# Patient Record
Sex: MALE | Race: WHITE | HISPANIC OR LATINO | URBAN - METROPOLITAN AREA
[De-identification: names, ages, dates, MRNs, and addresses within clinical notes are randomized per-mention and may not be internally consistent; named-entity substitution may affect disease eponyms.]

---

## 2022-04-06 ENCOUNTER — OFFICE VISIT (OUTPATIENT)
Dept: URBAN - METROPOLITAN AREA CLINIC 74 | Facility: CLINIC | Age: 62
End: 2022-04-06

## 2022-04-21 ENCOUNTER — OFFICE VISIT (OUTPATIENT)
Dept: URBAN - METROPOLITAN AREA CLINIC 74 | Facility: CLINIC | Age: 62
End: 2022-04-21

## 2022-06-09 ENCOUNTER — WEB ENCOUNTER (OUTPATIENT)
Dept: URBAN - METROPOLITAN AREA CLINIC 74 | Facility: CLINIC | Age: 62
End: 2022-06-09

## 2022-06-09 ENCOUNTER — OFFICE VISIT (OUTPATIENT)
Dept: URBAN - METROPOLITAN AREA CLINIC 74 | Facility: CLINIC | Age: 62
End: 2022-06-09
Payer: COMMERCIAL

## 2022-06-09 ENCOUNTER — LAB OUTSIDE AN ENCOUNTER (OUTPATIENT)
Dept: URBAN - METROPOLITAN AREA CLINIC 74 | Facility: CLINIC | Age: 62
End: 2022-06-09

## 2022-06-09 VITALS
SYSTOLIC BLOOD PRESSURE: 150 MMHG | HEIGHT: 66 IN | OXYGEN SATURATION: 95 % | TEMPERATURE: 97.6 F | WEIGHT: 206.6 LBS | BODY MASS INDEX: 33.2 KG/M2 | DIASTOLIC BLOOD PRESSURE: 80 MMHG | HEART RATE: 65 BPM

## 2022-06-09 DIAGNOSIS — Z86.19 HISTORY OF HEPATITIS C: ICD-10-CM

## 2022-06-09 DIAGNOSIS — Z86.010 PERSONAL HISTORY OF COLONIC POLYPS: ICD-10-CM

## 2022-06-09 DIAGNOSIS — E66.8 OTHER OBESITY: ICD-10-CM

## 2022-06-09 PROCEDURE — 99203 OFFICE O/P NEW LOW 30 MIN: CPT | Performed by: INTERNAL MEDICINE

## 2022-06-09 PROCEDURE — 99243 OFF/OP CNSLTJ NEW/EST LOW 30: CPT | Performed by: INTERNAL MEDICINE

## 2022-06-09 RX ORDER — POLYETHYLENE GLYOCOL 3350, SODIUM CHLORIDE, SODIUM BICARBONATE AND POTASSIUM CHLORIDE 420; 11.2; 5.72; 1.48 G/4L; G/4L; G/4L; G/4L
AS DIRECTED POWDER, FOR SOLUTION NASOGASTRIC; ORAL
Qty: 1 | Refills: 0 | OUTPATIENT
Start: 2022-06-09 | End: 2022-06-10

## 2022-06-09 NOTE — HPI-TODAY'S VISIT:
The patient is a 61-year-old [facility] male.  The patient was referred by Dr. Guru Andujar for consultation.  A copy of these document is being forwarded to the referring physician.  The patient presents to the office stating that he did have a colonoscopy possibly 7 or 8 years ago, at the time he had polyps removed.  Currently the patient denies having any diarrhea, constipation, rectal bleeding or hematochezia, abdominal pain, unexplained weight loss.  There is no family history of colon cancer or colon polyps.  Currently the patient denies having any dysphagia, odynophagia, heartburn or regurgitation, there is no nausea or vomiting.  There is no evidence of upper GI bleeding.  The patient claims that he did have hepatitis C years ago and that it was fully resolved.  The patient was treated by \[facility]\"".  We will obtain past medical records for review.  The patient has been advised to have a surveillance colonoscopy.  Benefits potential complications and alternatives to colonoscopy were disclosed.  The patient has been advised to lose weight.  The patient will return for a follow-up visit after completion of testing.

## 2022-07-19 ENCOUNTER — OFFICE VISIT (OUTPATIENT)
Dept: URBAN - METROPOLITAN AREA SURGERY CENTER 30 | Facility: SURGERY CENTER | Age: 62
End: 2022-07-19
Payer: COMMERCIAL

## 2022-07-19 DIAGNOSIS — Z12.11 COLON CANCER SCREENING: ICD-10-CM

## 2022-07-19 DIAGNOSIS — Z86.010 ADENOMAS PERSONAL HISTORY OF COLONIC POLYPS: ICD-10-CM

## 2022-07-19 PROCEDURE — 45378 DIAGNOSTIC COLONOSCOPY: CPT | Performed by: INTERNAL MEDICINE

## 2022-07-19 PROCEDURE — G8907 PT DOC NO EVENTS ON DISCHARG: HCPCS | Performed by: INTERNAL MEDICINE

## 2022-08-16 PROBLEM — 93871000119101: Status: ACTIVE | Noted: 2022-06-09

## 2022-08-18 ENCOUNTER — OFFICE VISIT (OUTPATIENT)
Dept: URBAN - METROPOLITAN AREA CLINIC 74 | Facility: CLINIC | Age: 62
End: 2022-08-18

## 2022-08-18 RX ORDER — MELOXICAM 7.5 MG
1 TABLET TABLET ORAL ONCE A DAY
Status: ACTIVE | COMMUNITY

## 2022-08-18 NOTE — HPI-TODAY'S VISIT:
The patient is a 61-year-old [facility] male.  The patient was referred by Dr. Guru Andujar for consultation.  A copy of these document is being forwarded to the referring physician.  The patient presents to the office stating that he did have a colonoscopy possibly 7 or 8 years ago, at the time he had polyps removed.  Currently the patient denies having any diarrhea, constipation, rectal bleeding or hematochezia, abdominal pain, unexplained weight loss.  There is no family history of colon cancer or colon polyps.  Currently the patient denies having any dysphagia, odynophagia, heartburn or regurgitation, there is no nausea or vomiting.  There is no evidence of upper GI bleeding.  The patient claims that he did have hepatitis C years ago and that it was fully resolved.  The patient was treated by [facility].  We will obtain past medical records for review.  The patient has been advised to have a surveillance colonoscopy.  Benefits potential complications and alternatives to colonoscopy were disclosed.  The patient has been advised to lose weight.  The patient will return for a follow-up visit after completion of testing.  Today August 18, 2022 the patient returns for a follow-up visit, the patient was last seen in the office on June 9, 2022 with personal history of colonic polyps, history of hepatitis C and obesity.  At the time of the visit the patient stated that he did have a colonoscopy 7 or 8 years before the visit and had polyps removed.  The patient denied having any diarrhea, constipation, rectal bleeding, hematochezia, abdominal pain or weight loss.  There was no family history of colon cancer or colon polyps.  The patient denies having any upper GI symptoms such as dysphagia, odynophagia, heartburn, regurgitation, there was no nausea or vomiting.  The patient did state that he did have hepatitis C years ago and was treated and it fully resolved.  The patient was treated by [facility].  Records were requested for review.  The patient was scheduled to have a colonoscopy.  The colonoscopy was performed on July 19, 2022 and it revealed a large amount of liquid stool throughout the transverse, ascending, cecum making visualization very difficult, extensive lavage was carried out with adequate visualization, the terminal ileum was normal.  The patient was found to have nonbleeding internal hemorrhoids.  The patient was advised to get a colonoscopy in3-5 years.  Records obtained from [facility] revealed that the patient had been treated for GERD, lactose intolerance, irritable bowel syndrome with diarrhea and personal history of colonic polyps.  Laboratory obtained October 2015 showed a negative hepatitis a antibody total, a negative hepatitis B surface antigen.  The patient had an EGD and a colonoscopy in December 2015, the EGD revealed normal mucosa compatible with nerd.,  Gastric erosions and duodenal erosions compatible with erosive gastritis and duodenitis.  The colonoscopy revealed 2 rectal polyps 3 and 5 mm each.  There were tubular adenomas, the gastric biopsies revealed reactive gastropathy.  There is no recent testing for hepatitis C.

## 2022-10-17 PROBLEM — 162864005: Status: ACTIVE | Noted: 2022-06-09

## 2022-10-17 PROBLEM — 428283002: Status: ACTIVE | Noted: 2022-06-09

## 2022-10-18 ENCOUNTER — DASHBOARD ENCOUNTERS (OUTPATIENT)
Age: 62
End: 2022-10-18

## 2022-10-19 ENCOUNTER — OFFICE VISIT (OUTPATIENT)
Dept: URBAN - METROPOLITAN AREA CLINIC 74 | Facility: CLINIC | Age: 62
End: 2022-10-19

## 2022-10-19 RX ORDER — MELOXICAM 7.5 MG
1 TABLET TABLET ORAL ONCE A DAY
Status: ACTIVE | COMMUNITY

## 2022-10-19 NOTE — HPI-TODAY'S VISIT:
The patient is a 61-year-old [facility] male.  The patient was referred by Dr. Guru Andujar for consultation.  A copy of these document is being forwarded to the referring physician.  The patient presents to the office stating that he did have a colonoscopy possibly 7 or 8 years ago, at the time he had polyps removed.  Currently the patient denies having any diarrhea, constipation, rectal bleeding or hematochezia, abdominal pain, unexplained weight loss.  There is no family history of colon cancer or colon polyps.  Currently the patient denies having any dysphagia, odynophagia, heartburn or regurgitation, there is no nausea or vomiting.  There is no evidence of upper GI bleeding.  The patient claims that he did have hepatitis C years ago and that it was fully resolved.  The patient was treated by [facility].  We will obtain past medical records for review.  The patient has been advised to have a surveillance colonoscopy.  Benefits potential complications and alternatives to colonoscopy were disclosed.  The patient has been advised to lose weight.  The patient will return for a follow-up visit after completion of testing.  Today August 18, 2022 the patient returns for a follow-up visit, the patient was last seen in the office on June 9, 2022 with personal history of colonic polyps, history of hepatitis C and obesity.  At the time of the visit the patient stated that he did have a colonoscopy 7 or 8 years before the visit and had polyps removed.  The patient denied having any diarrhea, constipation, rectal bleeding, hematochezia, abdominal pain or weight loss.  There was no family history of colon cancer or colon polyps.  The patient denies having any upper GI symptoms such as dysphagia, odynophagia, heartburn, regurgitation, there was no nausea or vomiting.  The patient did state that he did have hepatitis C years ago and was treated and it fully resolved.  The patient was treated by [facility].  Records were requested for review.  The patient was scheduled to have a colonoscopy.  The colonoscopy was performed on July 19, 2022 and it revealed a large amount of liquid stool throughout the transverse, ascending, cecum making visualization very difficult, extensive lavage was carried out with adequate visualization, the terminal ileum was normal.  The patient was found to have nonbleeding internal hemorrhoids.  The patient was advised to get a colonoscopy in3-5 years.  Records obtained from [facility] revealed that the patient had been treated for GERD, lactose intolerance, irritable bowel syndrome with diarrhea and personal history of colonic polyps.  Laboratory obtained October 2015 showed a negative hepatitis a antibody total, a negative hepatitis B surface antigen.  The patient had an EGD and a colonoscopy in December 2015, the EGD revealed normal mucosa compatible with nerd.,  Gastric erosions and duodenal erosions compatible with erosive gastritis and duodenitis.  The colonoscopy revealed 2 rectal polyps 3 and 5 mm each.  There were tubular adenomas, the gastric biopsies revealed reactive gastropathy.  There is no recent testing for hepatitis C.  Today October 19, 2022 the patient returns for a follow-up visit, the patient was last seen on August 18, 2022 with personal history of colonic polyps, obesity, history of hepatitis C and history of chronic gastritis.  At the time of the last visit the patient stated that he had colon polyps removed 7 or 8 years before, denies having any lower GI symptoms such as diarrhea, constipation, rectal bleeding, abdominal pain or weight loss.  There was no family history of colon cancer or colon polyps.  The patient denies having any upper GI symptoms.  The patient had a colonoscopy performed on July 19, 2022 which revealed a large amount of liquid stool throughout the transverse ascending colon as well as the cecum with poor visualization until extensive lavage was carried out.  The terminal ileum was normal.  The patient had nonbleeding internal hemorrhoids.  The patient was advised to get a colonoscopy in 3 to 5 years.  Review of records show that the patient had been seen by [facility] for GERD, lactose intolerance, irritable bowel syndrome with diarrhea and personal history of colonic polyps.  Laboratory obtained in October 2015 showed a negative hepatitis A antibody negative hepatitis B surface antigen.  A colonoscopy and EGD on December 15 revealed normal esophageal mucosa compatible with nerd, gastric erosions and duodenal erosions compatible with erosive gastritis and duodenitis.  Colonoscopy revealed 2 rectal polyps 2 and 5 mm each both tubular adenomas, gastric biopsies showed reactive gastropathy, there was no recent testing for hepatitis C.